# Patient Record
Sex: MALE | Race: WHITE | NOT HISPANIC OR LATINO | Employment: UNEMPLOYED | URBAN - METROPOLITAN AREA
[De-identification: names, ages, dates, MRNs, and addresses within clinical notes are randomized per-mention and may not be internally consistent; named-entity substitution may affect disease eponyms.]

---

## 2017-03-15 DIAGNOSIS — R22.0 SWELLING, MASS, OR LUMP IN HEAD AND NECK: Primary | ICD-10-CM

## 2017-03-15 DIAGNOSIS — R22.1 SWELLING, MASS, OR LUMP IN HEAD AND NECK: Primary | ICD-10-CM

## 2017-03-27 ENCOUNTER — HOSPITAL ENCOUNTER (OUTPATIENT)
Dept: RADIOLOGY | Facility: HOSPITAL | Age: 47
Discharge: HOME | End: 2017-03-27
Attending: FAMILY MEDICINE
Payer: COMMERCIAL

## 2017-03-27 DIAGNOSIS — R22.1 NECK FULLNESS: ICD-10-CM

## 2017-03-27 PROCEDURE — 76536 US EXAM OF HEAD AND NECK: CPT | Mod: TC,PO

## 2020-03-16 ENCOUNTER — HOSPITAL ENCOUNTER (EMERGENCY)
Facility: HOSPITAL | Age: 50
Discharge: HOME | End: 2020-03-16
Attending: EMERGENCY MEDICINE
Payer: COMMERCIAL

## 2020-03-16 VITALS
OXYGEN SATURATION: 99 % | HEART RATE: 81 BPM | HEIGHT: 67 IN | TEMPERATURE: 99 F | BODY MASS INDEX: 32.96 KG/M2 | DIASTOLIC BLOOD PRESSURE: 78 MMHG | WEIGHT: 210 LBS | SYSTOLIC BLOOD PRESSURE: 138 MMHG | RESPIRATION RATE: 18 BRPM

## 2020-03-16 DIAGNOSIS — J06.9 VIRAL URI WITH COUGH: Primary | ICD-10-CM

## 2020-03-16 DIAGNOSIS — R05.9 COUGH: ICD-10-CM

## 2020-03-16 DIAGNOSIS — R06.02 SHORTNESS OF BREATH: ICD-10-CM

## 2020-03-16 LAB
ALBUMIN SERPL BCP-MCNC: 4.7 G/DL (ref 3.5–5.2)
ALP SERPL-CCNC: 64 U/L (ref 38–126)
ALT SERPL W/O P-5'-P-CCNC: 26 U/L (ref 10–44)
ANION GAP SERPL CALC-SCNC: 9 MMOL/L (ref 8–16)
AST SERPL-CCNC: 35 U/L (ref 15–46)
BASOPHILS # BLD AUTO: 0.01 K/UL (ref 0–0.2)
BASOPHILS NFR BLD: 0.3 % (ref 0–1.9)
BILIRUB SERPL-MCNC: 0.3 MG/DL (ref 0.1–1)
BUN SERPL-MCNC: 9 MG/DL (ref 2–20)
CALCIUM SERPL-MCNC: 9.4 MG/DL (ref 8.7–10.5)
CHLORIDE SERPL-SCNC: 101 MMOL/L (ref 95–110)
CO2 SERPL-SCNC: 28 MMOL/L (ref 23–29)
CREAT SERPL-MCNC: 0.46 MG/DL (ref 0.5–1.4)
DIFFERENTIAL METHOD: ABNORMAL
EOSINOPHIL # BLD AUTO: 0 K/UL (ref 0–0.5)
EOSINOPHIL NFR BLD: 1.3 % (ref 0–8)
ERYTHROCYTE [DISTWIDTH] IN BLOOD BY AUTOMATED COUNT: 11.8 % (ref 11.5–14.5)
EST. GFR  (AFRICAN AMERICAN): >60 ML/MIN/1.73 M^2
EST. GFR  (NON AFRICAN AMERICAN): >60 ML/MIN/1.73 M^2
GLUCOSE SERPL-MCNC: 141 MG/DL (ref 70–110)
HCT VFR BLD AUTO: 40 % (ref 40–54)
HGB BLD-MCNC: 13.3 G/DL (ref 14–18)
IMM GRANULOCYTES # BLD AUTO: 0 K/UL (ref 0–0.04)
IMM GRANULOCYTES NFR BLD AUTO: 0 % (ref 0–0.5)
INFLUENZA A, MOLECULAR: NEGATIVE
INFLUENZA B, MOLECULAR: NEGATIVE
LYMPHOCYTES # BLD AUTO: 1.1 K/UL (ref 1–4.8)
LYMPHOCYTES NFR BLD: 36 % (ref 18–48)
MCH RBC QN AUTO: 31.5 PG (ref 27–31)
MCHC RBC AUTO-ENTMCNC: 33.3 G/DL (ref 32–36)
MCV RBC AUTO: 95 FL (ref 82–98)
MONOCYTES # BLD AUTO: 0.3 K/UL (ref 0.3–1)
MONOCYTES NFR BLD: 10.8 % (ref 4–15)
NEUTROPHILS # BLD AUTO: 1.6 K/UL (ref 1.8–7.7)
NEUTROPHILS NFR BLD: 51.6 % (ref 38–73)
NRBC BLD-RTO: 0 /100 WBC
PLATELET # BLD AUTO: 186 K/UL (ref 150–350)
PMV BLD AUTO: 8.9 FL (ref 9.2–12.9)
POTASSIUM SERPL-SCNC: 4 MMOL/L (ref 3.5–5.1)
PROT SERPL-MCNC: 8 G/DL (ref 6–8.4)
RBC # BLD AUTO: 4.22 M/UL (ref 4.6–6.2)
SODIUM SERPL-SCNC: 138 MMOL/L (ref 136–145)
SPECIMEN SOURCE: NORMAL
WBC # BLD AUTO: 3.14 K/UL (ref 3.9–12.7)

## 2020-03-16 PROCEDURE — 93005 ELECTROCARDIOGRAM TRACING: CPT | Mod: ER

## 2020-03-16 PROCEDURE — 87502 INFLUENZA DNA AMP PROBE: CPT | Mod: ER

## 2020-03-16 PROCEDURE — 80053 COMPREHEN METABOLIC PANEL: CPT | Mod: ER

## 2020-03-16 PROCEDURE — 85025 COMPLETE CBC W/AUTO DIFF WBC: CPT | Mod: ER

## 2020-03-16 PROCEDURE — 93010 EKG 12-LEAD: ICD-10-PCS | Mod: ,,, | Performed by: INTERNAL MEDICINE

## 2020-03-16 PROCEDURE — 99285 EMERGENCY DEPT VISIT HI MDM: CPT | Mod: 25,ER

## 2020-03-16 PROCEDURE — 93010 ELECTROCARDIOGRAM REPORT: CPT | Mod: ,,, | Performed by: INTERNAL MEDICINE

## 2020-03-16 NOTE — DISCHARGE INSTRUCTIONS
You do not meet the criteria for testing. Recommend you self quarantine (stay home and avoid public places). If your symptoms change or worsen, you should call the [facility] On [facility] line at [phone] or [phone] for immediate assistance and guidance on whether an in-person visit is needed.?

## 2020-03-16 NOTE — EKG INTERPRETATIONS - EMERGENCY DEPT.
Time of study: 03/16/2020 16:10    Independent interpretation by ED physician.    Normal sinus rhythm. Ventricular rate 81 bpm.  Normal axis.  Normal QRS and QT intervals.  No ST segment elevation or depression.  Normal T-wave morphology. Overall impression:  Normal EKG.

## 2020-03-16 NOTE — ED NOTES
Pt placed on isolation, lung signs are clear no apparent distress. O2 Sat is 100% currently on continuous monitoring.

## 2020-03-16 NOTE — ED PROVIDER NOTES
"Encounter Date: 3/16/2020       History     Chief Complaint   Patient presents with    Cough     Pt with c/o cough, fever (98-99) ans nausea x5 days. Pt also c/o being light headed and SOB that started today. Pt denies chest pain.     Fever    Shortness of Breath    Dizziness     Patient is a 49 year old male who presents with flu like symptoms for one week. He reports history of polymyositis for which he is no longer seeing rheumatology because they "wanted to treat me without a diagnosis". He is not on any steroids. He is not on immunosuppressive medications. He states he always a cough secondary to his blood pressure medication but recently "it has been breaking up in my chest". He had a Tmax of 99F which he states he has been checking at home. He reports shortness of breath that started 30 minutes PTA. He denied any chest pain, leg swelling, calf tenderness, recent travel or hormone use.  He denied recent sick contact.            Review of patient's allergies indicates:   Allergen Reactions    Methotrexate analogues Shortness Of Breath    Naproxen      Past Medical History:   Diagnosis Date    Arthritis     Polymyositis      Past Surgical History:   Procedure Laterality Date    CARPAL TUNNEL RELEASE  04/08/2013    right hand    Right knee arthroscopic  1991     Family History   Problem Relation Age of Onset    Diabetes Mother     Neuropathy Mother     Hypertension Mother     Heart disease Mother     Rheumatic fever Mother     Diabetes Father     Neuropathy Father     Cancer Father         multiple myeloma    Sarcoidosis Sister     Colon cancer Neg Hx     Celiac disease Neg Hx     Cirrhosis Neg Hx     Colon polyps Neg Hx     Crohn's disease Neg Hx     Cystic fibrosis Neg Hx     Esophageal cancer Neg Hx     Hemochromatosis Neg Hx     Inflammatory bowel disease Neg Hx     Irritable bowel syndrome Neg Hx     Liver cancer Neg Hx     Liver disease Neg Hx     Rectal cancer Neg Hx     " Stomach cancer Neg Hx     Ulcerative colitis Neg Hx     Zachery's disease Neg Hx      Social History     Tobacco Use    Smoking status: Current Every Day Smoker     Packs/day: 1.00     Years: 30.00     Pack years: 30.00     Types: Cigarettes    Smokeless tobacco: Never Used   Substance Use Topics    Alcohol use: Yes     Comment: rarely    Drug use: No     Review of Systems   Constitutional: Negative for activity change, appetite change, chills and fever.   HENT: Positive for sore throat. Negative for congestion and rhinorrhea.    Eyes: Negative for redness and visual disturbance.   Respiratory: Positive for cough and shortness of breath. Negative for chest tightness.    Cardiovascular: Negative for chest pain, palpitations and leg swelling.   Gastrointestinal: Negative for abdominal pain, diarrhea, nausea and vomiting.   Genitourinary: Negative for dysuria and frequency.   Musculoskeletal: Negative for back pain, neck pain and neck stiffness.   Skin: Negative for rash.   Neurological: Negative for dizziness, syncope, numbness and headaches.       Physical Exam     Initial Vitals [03/16/20 1536]   BP Pulse Resp Temp SpO2   139/89 83 18 98.5 °F (36.9 °C) 98 %      MAP       --         Physical Exam    Nursing note and vitals reviewed.  Constitutional: He appears well-developed and well-nourished. He is cooperative.  Non-toxic appearance. He does not have a sickly appearance.   HENT:   Head: Normocephalic and atraumatic.   Right Ear: External ear normal.   Left Ear: External ear normal.   Nose: Nose normal.   Mouth/Throat: Uvula is midline and oropharynx is clear and moist.   Eyes: Conjunctivae and lids are normal. Pupils are equal, round, and reactive to light.   Neck: Normal range of motion and full passive range of motion without pain. Neck supple.   Cardiovascular: Normal rate, regular rhythm and normal heart sounds. Exam reveals no gallop and no friction rub.    No murmur heard.  Pulmonary/Chest: Breath  sounds normal. He has no wheezes. He has no rhonchi. He has no rales.   Abdominal: Soft. Normal appearance. There is no tenderness. There is no rigidity, no rebound and no guarding.   Neurological: He is alert and oriented to person, place, and time. GCS eye subscore is 4. GCS verbal subscore is 5. GCS motor subscore is 6.   Skin: Skin is warm, dry and intact. No rash noted.         ED Course   Procedures  Labs Reviewed   CBC W/ AUTO DIFFERENTIAL - Abnormal; Notable for the following components:       Result Value    WBC 3.14 (*)     RBC 4.22 (*)     Hemoglobin 13.3 (*)     Mean Corpuscular Hemoglobin 31.5 (*)     MPV 8.9 (*)     Gran # (ANC) 1.6 (*)     All other components within normal limits   COMPREHENSIVE METABOLIC PANEL - Abnormal; Notable for the following components:    Glucose 141 (*)     Creatinine 0.46 (*)     All other components within normal limits   INFLUENZA A & B BY MOLECULAR     EKG Readings: (Independently Interpreted)   Rhythm: Normal Sinus Rhythm. Heart Rate: 77. Ectopy: No Ectopy. Conduction: Normal. ST Segments: Normal ST Segments. T Waves: Normal. Clinical Impression: Normal Sinus Rhythm       Imaging Results          X-Ray Chest 1 View (Final result)  Result time 03/16/20 16:22:56    Final result by LEISA Christiansen Sr., MD (03/16/20 16:22:56)                 Impression:      1. There is no evidence of an acute pulmonary process.  2. The size of the heart is prominent.  This may be secondary to magnification.  .      Electronically signed by: Zak Christiansen MD  Date:    03/16/2020  Time:    16:22             Narrative:    EXAMINATION:  XR CHEST 1 VIEW    CLINICAL HISTORY:  Cough    COMPARISON:  06/19/2015    FINDINGS:  The size of the heart is prominent.  There is no evidence of an acute pulmonary process.  There is no pneumothorax.  The costophrenic angles are sharp.                                 Medical Decision Making:   History:   Old Medical Records: I decided to obtain old medical  records.  Clinical Tests:   Lab Tests: Ordered and Reviewed  Radiological Study: Ordered and Reviewed  Medical Tests: Ordered and Reviewed       APC / Resident Notes:   Urgent evaluation of a 49 year old male who presents with cough, fever and shortness of breath. He reports Tmax of 99F. Vital signs reviewed. Abdomen is soft and nontender.  There is no rebound, rigidity or distention.  I doubt intra-abdominal process.  Bilateral TMs with no erythema, retraction or perforation.  There is no mastoid tenderness.  There is no movement tenderness to bilateral ears.  No tonsillar swelling or exudate noted.  Uvula is midline. I doubt strep. Breath sounds are clear and equal bilaterally. EKG shows no acute changes. CXR is normal. Labs are unremarkable. According to current testing criteria, patient does not meet criteria for COVID 19 testing. Recommend self quaratine. Given the number for patient hotline if symptoms change.  Symptomatic treatment. Discussed results with patient. Return precautions given. Patient is to follow up with their primary care provider.                               Clinical Impression:       ICD-10-CM ICD-9-CM   1. Viral URI with cough J06.9 465.9    B97.89    2. Cough R05 786.2   3. Shortness of breath R06.02 786.05             ED Disposition Condition    Discharge Stable        ED Prescriptions     None        Follow-up Information     Follow up With Specialties Details Why Contact Info    [facility]  Schedule an appointment as soon as possible for a visit  For follow up if you don't have a primary care provider [phone]    [facility]  As needed [address] [facility] [id number] [phone]                                     Ana Villavicencio PA-C  03/16/20 1955

## 2021-07-01 ENCOUNTER — PATIENT MESSAGE (OUTPATIENT)
Dept: ADMINISTRATIVE | Facility: OTHER | Age: 51
End: 2021-07-01

## 2024-03-03 ENCOUNTER — HOSPITAL ENCOUNTER (EMERGENCY)
Facility: HOSPITAL | Age: 54
Discharge: HOME | End: 2024-03-03
Attending: FAMILY MEDICINE
Payer: OTHER GOVERNMENT

## 2024-03-03 VITALS
SYSTOLIC BLOOD PRESSURE: 133 MMHG | OXYGEN SATURATION: 99 % | TEMPERATURE: 97 F | BODY MASS INDEX: 34.53 KG/M2 | RESPIRATION RATE: 19 BRPM | DIASTOLIC BLOOD PRESSURE: 62 MMHG | WEIGHT: 220 LBS | HEIGHT: 67 IN | HEART RATE: 62 BPM

## 2024-03-03 DIAGNOSIS — F41.9 ANXIETY: Primary | ICD-10-CM

## 2024-03-03 DIAGNOSIS — R00.2 PALPITATIONS: ICD-10-CM

## 2024-03-03 LAB
ALBUMIN SERPL BCP-MCNC: 4.8 G/DL (ref 3.5–5.2)
ALP SERPL-CCNC: 73 U/L (ref 38–126)
ALT SERPL W/O P-5'-P-CCNC: 29 U/L (ref 10–44)
ANION GAP SERPL CALC-SCNC: 10 MMOL/L (ref 8–16)
AST SERPL-CCNC: 32 U/L (ref 15–46)
BASOPHILS # BLD AUTO: 0.04 K/UL (ref 0–0.2)
BASOPHILS NFR BLD: 0.8 % (ref 0–1.9)
BILIRUB SERPL-MCNC: 0.5 MG/DL (ref 0.1–1)
CALCIUM SERPL-MCNC: 10.2 MG/DL (ref 8.7–10.5)
CHLORIDE SERPL-SCNC: 101 MMOL/L (ref 95–110)
CO2 SERPL-SCNC: 25 MMOL/L (ref 23–29)
CREAT SERPL-MCNC: 0.52 MG/DL (ref 0.5–1.4)
DIFFERENTIAL METHOD BLD: ABNORMAL
EOSINOPHIL # BLD AUTO: 0.2 K/UL (ref 0–0.5)
EOSINOPHIL NFR BLD: 3.1 % (ref 0–8)
ERYTHROCYTE [DISTWIDTH] IN BLOOD BY AUTOMATED COUNT: 11.9 % (ref 11.5–14.5)
EST. GFR  (NO RACE VARIABLE): >60 ML/MIN/1.73 M^2
GLUCOSE SERPL-MCNC: 136 MG/DL (ref 70–110)
HCT VFR BLD AUTO: 41.5 % (ref 40–54)
HGB BLD-MCNC: 14.1 G/DL (ref 14–18)
IMM GRANULOCYTES # BLD AUTO: 0.01 K/UL (ref 0–0.04)
IMM GRANULOCYTES NFR BLD AUTO: 0.2 % (ref 0–0.5)
LYMPHOCYTES # BLD AUTO: 1.5 K/UL (ref 1–4.8)
LYMPHOCYTES NFR BLD: 29.8 % (ref 18–48)
MCH RBC QN AUTO: 31.7 PG (ref 27–31)
MCHC RBC AUTO-ENTMCNC: 34 G/DL (ref 32–36)
MCV RBC AUTO: 93 FL (ref 82–98)
MONOCYTES # BLD AUTO: 0.4 K/UL (ref 0.3–1)
MONOCYTES NFR BLD: 8.6 % (ref 4–15)
NEUTROPHILS # BLD AUTO: 2.8 K/UL (ref 1.8–7.7)
NEUTROPHILS NFR BLD: 57.5 % (ref 38–73)
NRBC BLD-RTO: 0 /100 WBC
NT-PROBNP SERPL-MCNC: 40 PG/ML (ref 5–900)
PLATELET # BLD AUTO: 272 K/UL (ref 150–450)
PMV BLD AUTO: 9 FL (ref 9.2–12.9)
POTASSIUM SERPL-SCNC: 3.9 MMOL/L (ref 3.5–5.1)
PROT SERPL-MCNC: 8.6 G/DL (ref 6–8.4)
RBC # BLD AUTO: 4.45 M/UL (ref 4.6–6.2)
SODIUM SERPL-SCNC: 136 MMOL/L (ref 136–145)
TROPONIN I SERPL-MCNC: <0.012 NG/ML (ref 0.01–0.03)
UUN UR-MCNC: 14 MG/DL (ref 2–20)
WBC # BLD AUTO: 4.9 K/UL (ref 3.9–12.7)

## 2024-03-03 PROCEDURE — 93010 ELECTROCARDIOGRAM REPORT: CPT | Mod: ,,, | Performed by: INTERNAL MEDICINE

## 2024-03-03 PROCEDURE — 99284 EMERGENCY DEPT VISIT MOD MDM: CPT | Mod: 25,ER

## 2024-03-03 PROCEDURE — 99900035 HC TECH TIME PER 15 MIN (STAT): Mod: ER

## 2024-03-03 PROCEDURE — 85025 COMPLETE CBC W/AUTO DIFF WBC: CPT | Mod: ER | Performed by: FAMILY MEDICINE

## 2024-03-03 PROCEDURE — 93005 ELECTROCARDIOGRAM TRACING: CPT | Mod: ER

## 2024-03-03 PROCEDURE — 80053 COMPREHEN METABOLIC PANEL: CPT | Mod: ER | Performed by: FAMILY MEDICINE

## 2024-03-03 PROCEDURE — 83880 ASSAY OF NATRIURETIC PEPTIDE: CPT | Mod: ER | Performed by: FAMILY MEDICINE

## 2024-03-03 PROCEDURE — 84484 ASSAY OF TROPONIN QUANT: CPT | Mod: ER | Performed by: FAMILY MEDICINE

## 2024-03-03 NOTE — ED PROVIDER NOTES
Encounter Date: 3/3/2024       History     Chief Complaint   Patient presents with    Medication Reaction     Pt started metoprolol 12.5mg this morning. Pt states he know feels jittery, faint, and flush.      53-year-old male has been prescribed metoprolol for his blood pressure.  Patient claims he is feeling jittery faint and flushed immediately after taking at 6:15 a.m. today.  No tingling numbness or weakness.  No chest pain or shortness of breath.    The history is provided by the patient.     Review of patient's allergies indicates:   Allergen Reactions    Methotrexate analogues Shortness Of Breath    Naproxen      Past Medical History:   Diagnosis Date    Arthritis     Polymyositis      Past Surgical History:   Procedure Laterality Date    CARPAL TUNNEL RELEASE  04/08/2013    right hand    Right knee arthroscopic  1991     Family History   Problem Relation Age of Onset    Diabetes Mother     Neuropathy Mother     Hypertension Mother     Heart disease Mother     Rheumatic fever Mother     Diabetes Father     Neuropathy Father     Cancer Father         multiple myeloma    Sarcoidosis Sister     Colon cancer Neg Hx     Celiac disease Neg Hx     Cirrhosis Neg Hx     Colon polyps Neg Hx     Crohn's disease Neg Hx     Cystic fibrosis Neg Hx     Esophageal cancer Neg Hx     Hemochromatosis Neg Hx     Inflammatory bowel disease Neg Hx     Irritable bowel syndrome Neg Hx     Liver cancer Neg Hx     Liver disease Neg Hx     Rectal cancer Neg Hx     Stomach cancer Neg Hx     Ulcerative colitis Neg Hx     Zachery's disease Neg Hx      Social History     Tobacco Use    Smoking status: Every Day     Current packs/day: 1.00     Average packs/day: 1 pack/day for 30.0 years (30.0 ttl pk-yrs)     Types: Cigarettes    Smokeless tobacco: Never   Substance Use Topics    Alcohol use: Yes     Comment: rarely    Drug use: No     Review of Systems   Constitutional:  Negative for fever.   HENT:  Negative for sore throat.    Respiratory:   Negative for shortness of breath.    Cardiovascular:  Negative for chest pain.   Gastrointestinal:  Negative for nausea.   Genitourinary:  Negative for dysuria.   Musculoskeletal:  Negative for back pain.   Skin:  Negative for rash.   Neurological:  Negative for weakness.   Hematological:  Does not bruise/bleed easily.   All other systems reviewed and are negative.      Physical Exam     Initial Vitals [03/03/24 0638]   BP Pulse Resp Temp SpO2   (!) 163/75 74 16 97.4 °F (36.3 °C) 100 %      MAP       --         Physical Exam    Nursing note and vitals reviewed.  Constitutional: Vital signs are normal. He appears well-developed and well-nourished. He is active. No distress.   HENT:   Head: Normocephalic.   Nose: Nose normal.   Mouth/Throat: Oropharynx is clear and moist and mucous membranes are normal.   Eyes: Conjunctivae, EOM and lids are normal.   Neck: Neck supple.   Normal range of motion.  Cardiovascular:  Normal rate, regular rhythm, S1 normal, S2 normal and intact distal pulses.           Murmur heard.  Systolic murmur is present with a grade of 4/6.  Pulmonary/Chest: Breath sounds normal. No respiratory distress. He has no wheezes. He has no rales.   Abdominal: Abdomen is soft. Bowel sounds are normal. He exhibits no distension. There is no abdominal tenderness. There is no rebound.   Musculoskeletal:      Right upper arm: Normal.      Left upper arm: Normal.      Cervical back: Normal range of motion and neck supple.      Right lower leg: Normal.      Left lower leg: Normal.     Neurological: He is alert and oriented to person, place, and time. He has normal strength. GCS score is 15. GCS eye subscore is 4. GCS verbal subscore is 5. GCS motor subscore is 6.   Skin: Skin is warm. Capillary refill takes less than 2 seconds.   Psychiatric: He has a normal mood and affect. His speech is normal and behavior is normal. Thought content normal. Cognition and memory are normal.         ED Course   Procedures  Labs  Reviewed   CBC W/ AUTO DIFFERENTIAL - Abnormal; Notable for the following components:       Result Value    RBC 4.45 (*)     MCH 31.7 (*)     MPV 9.0 (*)     All other components within normal limits   COMPREHENSIVE METABOLIC PANEL - Abnormal; Notable for the following components:    Glucose 136 (*)     Total Protein 8.6 (*)     All other components within normal limits   NT-PRO NATRIURETIC PEPTIDE   TROPONIN I     EKG Readings: (Independently Interpreted)   Initial Reading: No STEMI. Rhythm: Normal Sinus Rhythm. Heart Rate: 61. Ectopy: No Ectopy. Conduction: Normal. ST Segments: Normal ST Segments. T Waves: Normal. Clinical Impression: Normal Sinus Rhythm       Imaging Results    None          Medications - No data to display  Medical Decision Making  53-year-old male started on metoprolol for his blood pressure and took 1st half dose this morning on empty stomach and felt like fainting.  His blood pressure was stable.  His heart rate stable on arrival to ED. no chest pain or shortness of breath.    Differential diagnosis include and not limited to medication side effects, hypoglycemia, hypotension, bradycardia.    Patient placed on cardiac monitor with normal sinus rhythm.  His heart rate 60-70 on an average.  EKG with no acute changes.  Labs reviewed with no acute findings.  Of WBC 4.9  Patient asymptomatic currently.  Will discharge home and advised to eat and try medication if his blood pressure and heart rate are within normal range.  Follow up immediately with ED with any worsening symptoms.  Advised to follow up with his PCP/cardiology with any further symptoms or change or control his meds.    Amount and/or Complexity of Data Reviewed  Labs: ordered. Decision-making details documented in ED Course.                                      Clinical Impression:  Final diagnoses:  [R00.2] Palpitations  [F41.9] Anxiety (Primary)          ED Disposition Condition    Discharge Stable          ED Prescriptions     None       Follow-up Information       Follow up With Specialties Details Why Contact Info    Primarycare physician  In 2 days F/U              Lane Choudhary MD  03/03/24 0958

## 2024-03-07 LAB
OHS QRS DURATION: 98 MS
OHS QTC CALCULATION: 406 MS

## 2024-03-18 ENCOUNTER — HOSPITAL ENCOUNTER (EMERGENCY)
Facility: HOSPITAL | Age: 54
Discharge: HOME | End: 2024-03-18
Attending: FAMILY MEDICINE
Payer: OTHER GOVERNMENT

## 2024-03-18 VITALS
WEIGHT: 220 LBS | HEIGHT: 67 IN | OXYGEN SATURATION: 96 % | TEMPERATURE: 98 F | DIASTOLIC BLOOD PRESSURE: 80 MMHG | HEART RATE: 66 BPM | SYSTOLIC BLOOD PRESSURE: 176 MMHG | BODY MASS INDEX: 34.53 KG/M2 | RESPIRATION RATE: 20 BRPM

## 2024-03-18 DIAGNOSIS — T50.905D ADVERSE EFFECT OF DRUG, SUBSEQUENT ENCOUNTER: Primary | ICD-10-CM

## 2024-03-18 DIAGNOSIS — R06.02 SOB (SHORTNESS OF BREATH): ICD-10-CM

## 2024-03-18 DIAGNOSIS — R07.9 CHEST PAIN: ICD-10-CM

## 2024-03-18 DIAGNOSIS — I10 HYPERTENSION, UNSPECIFIED TYPE: ICD-10-CM

## 2024-03-18 LAB
ALBUMIN SERPL BCP-MCNC: 4.3 G/DL (ref 3.5–5.2)
ALP SERPL-CCNC: 78 U/L (ref 38–126)
ALT SERPL W/O P-5'-P-CCNC: 27 U/L (ref 10–44)
ANION GAP SERPL CALC-SCNC: 8 MMOL/L (ref 8–16)
AST SERPL-CCNC: 28 U/L (ref 15–46)
BASOPHILS # BLD AUTO: 0.04 K/UL (ref 0–0.2)
BASOPHILS NFR BLD: 0.8 % (ref 0–1.9)
BILIRUB SERPL-MCNC: 0.3 MG/DL (ref 0.1–1)
CALCIUM SERPL-MCNC: 9.4 MG/DL (ref 8.7–10.5)
CHLORIDE SERPL-SCNC: 103 MMOL/L (ref 95–110)
CO2 SERPL-SCNC: 28 MMOL/L (ref 23–29)
CREAT SERPL-MCNC: 0.59 MG/DL (ref 0.5–1.4)
DIFFERENTIAL METHOD BLD: ABNORMAL
EOSINOPHIL # BLD AUTO: 0.3 K/UL (ref 0–0.5)
EOSINOPHIL NFR BLD: 5 % (ref 0–8)
ERYTHROCYTE [DISTWIDTH] IN BLOOD BY AUTOMATED COUNT: 12.3 % (ref 11.5–14.5)
EST. GFR  (NO RACE VARIABLE): >60 ML/MIN/1.73 M^2
GLUCOSE SERPL-MCNC: 121 MG/DL (ref 70–110)
HCT VFR BLD AUTO: 36.8 % (ref 40–54)
HGB BLD-MCNC: 12.6 G/DL (ref 14–18)
IMM GRANULOCYTES # BLD AUTO: 0.02 K/UL (ref 0–0.04)
IMM GRANULOCYTES NFR BLD AUTO: 0.4 % (ref 0–0.5)
LYMPHOCYTES # BLD AUTO: 1.7 K/UL (ref 1–4.8)
LYMPHOCYTES NFR BLD: 33.7 % (ref 18–48)
MCH RBC QN AUTO: 32.1 PG (ref 27–31)
MCHC RBC AUTO-ENTMCNC: 34.2 G/DL (ref 32–36)
MCV RBC AUTO: 94 FL (ref 82–98)
MONOCYTES # BLD AUTO: 0.5 K/UL (ref 0.3–1)
MONOCYTES NFR BLD: 9.9 % (ref 4–15)
NEUTROPHILS # BLD AUTO: 2.6 K/UL (ref 1.8–7.7)
NEUTROPHILS NFR BLD: 50.2 % (ref 38–73)
NRBC BLD-RTO: 0 /100 WBC
NT-PROBNP SERPL-MCNC: 52 PG/ML (ref 5–900)
PLATELET # BLD AUTO: 258 K/UL (ref 150–450)
PMV BLD AUTO: 8.9 FL (ref 9.2–12.9)
POTASSIUM SERPL-SCNC: 3.8 MMOL/L (ref 3.5–5.1)
PROT SERPL-MCNC: 8 G/DL (ref 6–8.4)
RBC # BLD AUTO: 3.92 M/UL (ref 4.6–6.2)
SODIUM SERPL-SCNC: 139 MMOL/L (ref 136–145)
TROPONIN I SERPL-MCNC: <0.012 NG/ML (ref 0.01–0.03)
UUN UR-MCNC: 14 MG/DL (ref 2–20)
WBC # BLD AUTO: 5.17 K/UL (ref 3.9–12.7)

## 2024-03-18 PROCEDURE — 93010 ELECTROCARDIOGRAM REPORT: CPT | Mod: ,,, | Performed by: INTERNAL MEDICINE

## 2024-03-18 PROCEDURE — 93005 ELECTROCARDIOGRAM TRACING: CPT | Mod: ER

## 2024-03-18 PROCEDURE — 80053 COMPREHEN METABOLIC PANEL: CPT | Mod: ER

## 2024-03-18 PROCEDURE — 84484 ASSAY OF TROPONIN QUANT: CPT | Mod: ER

## 2024-03-18 PROCEDURE — 83880 ASSAY OF NATRIURETIC PEPTIDE: CPT | Mod: ER

## 2024-03-18 PROCEDURE — 85025 COMPLETE CBC W/AUTO DIFF WBC: CPT | Mod: ER

## 2024-03-18 PROCEDURE — 99285 EMERGENCY DEPT VISIT HI MDM: CPT | Mod: 25,ER

## 2024-03-18 RX ORDER — ISOSORBIDE MONONITRATE 30 MG/1
30 TABLET, EXTENDED RELEASE ORAL DAILY
Qty: 30 TABLET | Refills: 5 | Status: SHIPPED | OUTPATIENT
Start: 2024-03-18 | End: 2024-04-23

## 2024-03-18 RX ORDER — METOPROLOL TARTRATE 25 MG/1
25 TABLET, FILM COATED ORAL 2 TIMES DAILY
COMMUNITY
End: 2024-03-18

## 2024-03-18 NOTE — ED NOTES
Review of patient's allergies indicates:   Allergen Reactions    Methotrexate analogues Shortness Of Breath    Naproxen         Patient has verified the spelling of the name and  on armband.   APPEARANCE: Patient is alert, calm, oriented x 4, and does not appear distressed.  SKIN: Skin is normal for race, warm, and dry. Normal skin turgor and mucous membranes moist. Has scattered rash to his face with redness noted, that he states is from another BP medication and that is why he was changed to Metoprolol.   CARDIAC: Normal rate and rhythm, no murmur heard. C/o intermittent chest pain since starting Metoprolol.    RESPIRATORY:Normal rate and effort. Breath sounds clear bilaterally throughout chest. Respirations are equal and unlabored.  C/o intermittent  SOB since taking Metoprolol that he started 2 weeks ago.   GASTRO: Bowel sounds normal, abdomen is soft, no tenderness, and no abdominal distention.  MUSCLE: Full ROM. No bony tenderness or soft tissue tenderness. No obvious deformity.  PERIPHERAL VASCULAR: peripheral pulses present. Normal cap refill. No edema. Warm to touch.  NEURO: Hemlock coma scale: eyes open spontaneously-4, oriented & converses-5, obeys commands-6. No neurological abnormalities.   MENTAL STATUS: awake, alert and aware of environment.  EYE: No overt deficits noted. No drainage. Sclera WNL  ENT: EARS: no obvious drainage. NOSE: no active bleeding. THROAT: no redness or swelling..  : Voids without complication per patient

## 2024-03-18 NOTE — ED PROVIDER NOTES
Encounter Date: 3/18/2024       History     Chief Complaint   Patient presents with    Shortness of Breath    Chest Pain     Pt reports being on metoprolol tartrate 25 mg about 2 weeks ago, now having SOB, fatigue, chest pain, and HTN. States /160.      Malik Cunningham Jr. is a 53 y.o. male who has a past medical history of Arthritis and Polymyositis. presenting to the Emergency Department for possible adverse effect of medication.   He reports medication change about two weeks ago, switched from lisinopril to metoprolol 12.5 mg bid due to facial rash. Since the medication change he feels short of breath when he first lays down to go to sleep. Reports he has to really focus on his breathing and this is worrying him. He also feels short of breath and palpitations with exertion. The palpitations are discomforting and this is what he attributes the chest pain to when reporting CP in triage. Also reports lightheadedness, facial flushing, mild headache. Reports orthopnea. No PND. No lower extremity edema. No cough. He does have history of interstitial lung disease, but no asthma. Has intermittently used albuterol inhaler in the past but none recently. He feels mild shortness of breath at this time, but otherwise feels fairly well. He does monitor his BP multiple times a day and is concerned the metoprolol is not adequately controlling his pressure. He has been taking the medication 12.5 q8 and reports home readings 150-160s. He has attempted to follow up with his primary care doctor today, but has been told an available appointment is months out.      The history is provided by the patient.     Review of patient's allergies indicates:   Allergen Reactions    Methotrexate analogues Shortness Of Breath    Naproxen      Past Medical History:   Diagnosis Date    Arthritis     Polymyositis      Past Surgical History:   Procedure Laterality Date    CARPAL TUNNEL RELEASE  04/08/2013    right hand    Right knee  arthroscopic  1991     Family History   Problem Relation Age of Onset    Diabetes Mother     Neuropathy Mother     Hypertension Mother     Heart disease Mother     Rheumatic fever Mother     Diabetes Father     Neuropathy Father     Cancer Father         multiple myeloma    Sarcoidosis Sister     Colon cancer Neg Hx     Celiac disease Neg Hx     Cirrhosis Neg Hx     Colon polyps Neg Hx     Crohn's disease Neg Hx     Cystic fibrosis Neg Hx     Esophageal cancer Neg Hx     Hemochromatosis Neg Hx     Inflammatory bowel disease Neg Hx     Irritable bowel syndrome Neg Hx     Liver cancer Neg Hx     Liver disease Neg Hx     Rectal cancer Neg Hx     Stomach cancer Neg Hx     Ulcerative colitis Neg Hx     Zachery's disease Neg Hx      Social History     Tobacco Use    Smoking status: Every Day     Current packs/day: 1.00     Average packs/day: 1 pack/day for 30.0 years (30.0 ttl pk-yrs)     Types: Cigarettes    Smokeless tobacco: Never   Substance Use Topics    Alcohol use: Yes     Comment: rarely    Drug use: No     Review of Systems   Constitutional:  Negative for chills and fever.   HENT:  Negative for congestion.         Facial flushing   Respiratory:  Positive for shortness of breath. Negative for cough.    Cardiovascular:  Positive for palpitations. Negative for chest pain and leg swelling.   Gastrointestinal:  Negative for abdominal pain, nausea and vomiting.   Genitourinary:  Negative for dysuria.   Neurological:  Positive for light-headedness and headaches. Negative for syncope, speech difficulty and weakness.   Psychiatric/Behavioral:  Negative for agitation and confusion.        Physical Exam     Initial Vitals [03/18/24 1536]   BP Pulse Resp Temp SpO2   (!) 165/75 70 20 98.3 °F (36.8 °C) 97 %      MAP       --         Physical Exam    Nursing note and vitals reviewed.  Constitutional: He appears well-developed and well-nourished. He is not diaphoretic.  Non-toxic appearance. No distress.   HENT:   Head:  Normocephalic and atraumatic.   Right Ear: External ear normal.   Left Ear: External ear normal.   Mouth/Throat: Oropharynx is clear and moist.   Eyes: Conjunctivae and EOM are normal. Pupils are equal, round, and reactive to light.   Neck: Neck supple.   Normal range of motion.  Cardiovascular:  Normal rate and regular rhythm.           Pulmonary/Chest: Breath sounds normal. No respiratory distress. He has no wheezes. He has no rales.   Abdominal: He exhibits no distension.   Musculoskeletal:         General: No edema. Normal range of motion.      Cervical back: Normal range of motion and neck supple.     Neurological: He is alert and oriented to person, place, and time. GCS score is 15. GCS eye subscore is 4. GCS verbal subscore is 5. GCS motor subscore is 6.   Skin: Skin is dry. Rash (facial erythema and papules) noted.   Psychiatric: He has a normal mood and affect. His behavior is normal. Judgment and thought content normal.         ED Course   Procedures  Labs Reviewed   CBC W/ AUTO DIFFERENTIAL - Abnormal; Notable for the following components:       Result Value    RBC 3.92 (*)     Hemoglobin 12.6 (*)     Hematocrit 36.8 (*)     MCH 32.1 (*)     MPV 8.9 (*)     All other components within normal limits   COMPREHENSIVE METABOLIC PANEL - Abnormal; Notable for the following components:    Glucose 121 (*)     All other components within normal limits   TROPONIN I   NT-PRO NATRIURETIC PEPTIDE     EKG Readings: (Independently Interpreted)   Initial Reading: No STEMI. Rhythm: Normal Sinus Rhythm. Heart Rate: 67. Ectopy: No Ectopy.       Imaging Results              X-Ray Chest PA And Lateral (Final result)  Result time 03/18/24 16:31:23      Final result by Jason Fuchs MD (03/18/24 16:31:23)                   Impression:      No acute findings.      Electronically signed by: Jason Fuchs MD  Date:    03/18/2024  Time:    16:31               Narrative:    EXAMINATION:  XR CHEST PA AND LATERAL    CLINICAL  HISTORY:  Chest Pain;    TECHNIQUE:  PA and lateral views of the chest were performed.    COMPARISON:  03/16/2020    FINDINGS:  The cardiac and mediastinal silhouettes appear within normal limits.   The lungs are clear bilaterally.  No acute osseous findings demonstrated.                                       Medications - No data to display  Medical Decision Making  53-year-old presents to the ED with a constellation of symptoms that began after starting metoprolol including SOB/MATHIS, palpitations that he correlates with CP, facial flushing, lightheadedness, mild headache. He reports feeling fairly well at this time. Vitals mildly hypertensive but otherwise normal. Normal exam. Cardiac workup.     Differential Diagnosis includes, but is not limited to:  ACS/MI, PE, aortic dissection, pneumothorax, cardiac tamponade, pericarditis/myocarditis, pneumonia, infection/abscess, lung mass, trauma/fracture, costochondritis/pleurisy, MSK pain/contusion, GERD, biliary disease, pancreatitis, anemia, adverse effect of medication    Labs grossly unrevealing.  Chest x-ray is normal.  EKG NSR with no ischemic changes.  Discussed patient's presentation and workup with my attending Dr. Choudhary who agrees it is not unreasonable to change patient's BP medication given difficulty following up with PCP. Will discontinue metoprolol and switch to Imdur.  Patient instructed to continue calling his primary care doctor to obtain a follow up appointment. He voiced understanding and is agreeable to this plan. All questions answered.     Amount and/or Complexity of Data Reviewed  External Data Reviewed: notes.     Details: Seen 3/3/24 for lightheadedness after initial dose of metoprolol  Labs: ordered. Decision-making details documented in ED Course.  Radiology: ordered. Decision-making details documented in ED Course.    Risk  Prescription drug management.      Additional MDM:   Heart Score:    History:          Slightly suspicious.  ECG:              Normal  Age:               45-65 years  Risk factors: 1-2 risk factors  Troponin:       Less than or equal to normal limit  Heart Score = 2                ED Course as of 03/19/24 1643   Mon Mar 18, 2024   1646 X-Ray Chest PA And Lateral  No acute findings. [CS]   1704 Discussed patient with Dr. Choudhary. Not unreasonable to change BP med to imdur or hctz [CS]   1728 CBC auto differential(!)  Mild anemia. No leukocytosis [CS]   1753 Comprehensive metabolic panel(!)  No significant abnormality [CS]   1753 NT-proBNP: 52  WNL [CS]   1753 Troponin I: <0.012  WNL [CS]      ED Course User Index  [CS] Jen Quezada PA-C                           Clinical Impression:  Final diagnoses:  [R06.02] SOB (shortness of breath)  [R07.9] Chest pain  [I10] Hypertension, unspecified type  [T50.905D] Adverse effect of drug, subsequent encounter (Primary)          ED Disposition Condition    Discharge Stable          ED Prescriptions       Medication Sig Dispense Start Date End Date Auth. Provider    isosorbide mononitrate (IMDUR) 30 MG 24 hr tablet Take 1 tablet (30 mg total) by mouth once daily. 30 tablet 3/18/2024 9/14/2024 Jen Quezada PA-C          Follow-up Information       Follow up With Specialties Details Why Contact Info    Primary Care Provider  Schedule an appointment as soon as possible for a visit in 2 days               Jen Quezada PA-C  03/19/24 1642       Jen Quezada PA-C  03/19/24 1643

## 2024-03-18 NOTE — DISCHARGE INSTRUCTIONS
Please schedule a follow up as soon as possible with your primary care doctor.     START taking imdur.     STOP taking the metoprolol.     Your blood work looks reassuring here today.

## 2024-03-20 ENCOUNTER — HOSPITAL ENCOUNTER (EMERGENCY)
Facility: HOSPITAL | Age: 54
Discharge: HOME | End: 2024-03-20
Attending: EMERGENCY MEDICINE
Payer: OTHER GOVERNMENT

## 2024-03-20 VITALS
OXYGEN SATURATION: 98 % | RESPIRATION RATE: 16 BRPM | WEIGHT: 220 LBS | BODY MASS INDEX: 34.53 KG/M2 | HEART RATE: 65 BPM | HEIGHT: 67 IN | SYSTOLIC BLOOD PRESSURE: 151 MMHG | TEMPERATURE: 98 F | DIASTOLIC BLOOD PRESSURE: 75 MMHG

## 2024-03-20 DIAGNOSIS — R23.2 FACIAL FLUSHING: Primary | ICD-10-CM

## 2024-03-20 DIAGNOSIS — R01.1 SYSTOLIC MURMUR: ICD-10-CM

## 2024-03-20 LAB
ALBUMIN SERPL BCP-MCNC: 4.4 G/DL (ref 3.5–5.2)
ALP SERPL-CCNC: 78 U/L (ref 38–126)
ALT SERPL W/O P-5'-P-CCNC: 26 U/L (ref 10–44)
ANION GAP SERPL CALC-SCNC: 7 MMOL/L (ref 8–16)
AST SERPL-CCNC: 28 U/L (ref 15–46)
BASOPHILS # BLD AUTO: 0.03 K/UL (ref 0–0.2)
BASOPHILS NFR BLD: 0.7 % (ref 0–1.9)
BILIRUB SERPL-MCNC: 0.4 MG/DL (ref 0.1–1)
CALCIUM SERPL-MCNC: 9.5 MG/DL (ref 8.7–10.5)
CHLORIDE SERPL-SCNC: 103 MMOL/L (ref 95–110)
CO2 SERPL-SCNC: 30 MMOL/L (ref 23–29)
CREAT SERPL-MCNC: 0.52 MG/DL (ref 0.5–1.4)
DIFFERENTIAL METHOD BLD: ABNORMAL
EOSINOPHIL # BLD AUTO: 0.2 K/UL (ref 0–0.5)
EOSINOPHIL NFR BLD: 5.8 % (ref 0–8)
ERYTHROCYTE [DISTWIDTH] IN BLOOD BY AUTOMATED COUNT: 12.1 % (ref 11.5–14.5)
EST. GFR  (NO RACE VARIABLE): >60 ML/MIN/1.73 M^2
GLUCOSE SERPL-MCNC: 97 MG/DL (ref 70–110)
HCT VFR BLD AUTO: 36.9 % (ref 40–54)
HGB BLD-MCNC: 12.5 G/DL (ref 14–18)
IMM GRANULOCYTES # BLD AUTO: 0 K/UL (ref 0–0.04)
IMM GRANULOCYTES NFR BLD AUTO: 0 % (ref 0–0.5)
LYMPHOCYTES # BLD AUTO: 1.6 K/UL (ref 1–4.8)
LYMPHOCYTES NFR BLD: 38.9 % (ref 18–48)
MAGNESIUM SERPL-MCNC: 2.3 MG/DL (ref 1.6–2.6)
MCH RBC QN AUTO: 31.9 PG (ref 27–31)
MCHC RBC AUTO-ENTMCNC: 33.9 G/DL (ref 32–36)
MCV RBC AUTO: 94 FL (ref 82–98)
MONOCYTES # BLD AUTO: 0.5 K/UL (ref 0.3–1)
MONOCYTES NFR BLD: 11.5 % (ref 4–15)
NEUTROPHILS # BLD AUTO: 1.8 K/UL (ref 1.8–7.7)
NEUTROPHILS NFR BLD: 43.1 % (ref 38–73)
NRBC BLD-RTO: 0 /100 WBC
NT-PROBNP SERPL-MCNC: 116 PG/ML (ref 5–900)
OHS QRS DURATION: 92 MS
OHS QTC CALCULATION: 405 MS
PLATELET # BLD AUTO: 236 K/UL (ref 150–450)
PMV BLD AUTO: 8.7 FL (ref 9.2–12.9)
POTASSIUM SERPL-SCNC: 3.9 MMOL/L (ref 3.5–5.1)
PROT SERPL-MCNC: 7.9 G/DL (ref 6–8.4)
RBC # BLD AUTO: 3.92 M/UL (ref 4.6–6.2)
SODIUM SERPL-SCNC: 140 MMOL/L (ref 136–145)
TROPONIN I SERPL-MCNC: <0.012 NG/ML (ref 0.01–0.03)
TSH SERPL DL<=0.005 MIU/L-ACNC: 2.06 UIU/ML (ref 0.4–4)
UUN UR-MCNC: 11 MG/DL (ref 2–20)
WBC # BLD AUTO: 4.16 K/UL (ref 3.9–12.7)

## 2024-03-20 PROCEDURE — 93005 ELECTROCARDIOGRAM TRACING: CPT | Mod: ER

## 2024-03-20 PROCEDURE — 84484 ASSAY OF TROPONIN QUANT: CPT | Mod: ER | Performed by: EMERGENCY MEDICINE

## 2024-03-20 PROCEDURE — 83880 ASSAY OF NATRIURETIC PEPTIDE: CPT | Mod: ER | Performed by: EMERGENCY MEDICINE

## 2024-03-20 PROCEDURE — 94761 N-INVAS EAR/PLS OXIMETRY MLT: CPT | Mod: ER

## 2024-03-20 PROCEDURE — 84443 ASSAY THYROID STIM HORMONE: CPT | Mod: ER | Performed by: EMERGENCY MEDICINE

## 2024-03-20 PROCEDURE — 80053 COMPREHEN METABOLIC PANEL: CPT | Mod: ER | Performed by: EMERGENCY MEDICINE

## 2024-03-20 PROCEDURE — 83735 ASSAY OF MAGNESIUM: CPT | Mod: ER | Performed by: EMERGENCY MEDICINE

## 2024-03-20 PROCEDURE — 99900035 HC TECH TIME PER 15 MIN (STAT): Mod: ER

## 2024-03-20 PROCEDURE — 93010 ELECTROCARDIOGRAM REPORT: CPT | Mod: ,,, | Performed by: INTERNAL MEDICINE

## 2024-03-20 PROCEDURE — 25500020 PHARM REV CODE 255: Mod: ER | Performed by: EMERGENCY MEDICINE

## 2024-03-20 PROCEDURE — 99285 EMERGENCY DEPT VISIT HI MDM: CPT | Mod: 25,ER

## 2024-03-20 PROCEDURE — 85025 COMPLETE CBC W/AUTO DIFF WBC: CPT | Mod: ER | Performed by: EMERGENCY MEDICINE

## 2024-03-20 RX ADMIN — IOHEXOL 75 ML: 350 INJECTION, SOLUTION INTRAVENOUS at 01:03

## 2024-03-20 NOTE — ED PROVIDER NOTES
Encounter Date: 3/20/2024       History     Chief Complaint   Patient presents with    blood pressure issues      Patient had facial pressure and redness last night and this morning, has been dealing with blood pressure medication changes and thinks it is related      Patient presents with complaints of facial flushing, feeling of fullness to his neck for the past few days.  He denies any chest pain/pressure/tightness.  He denies any shortness of breath or dyspnea on exertion.  Denies any fevers/chills.  States had a recent medication change to metoprolol and has been having these problems since.    The history is provided by the patient.     Review of patient's allergies indicates:   Allergen Reactions    Methotrexate analogues Shortness Of Breath    Naproxen      Past Medical History:   Diagnosis Date    Arthritis     Polymyositis      Past Surgical History:   Procedure Laterality Date    CARPAL TUNNEL RELEASE  04/08/2013    right hand    Right knee arthroscopic  1991     Family History   Problem Relation Age of Onset    Diabetes Mother     Neuropathy Mother     Hypertension Mother     Heart disease Mother     Rheumatic fever Mother     Diabetes Father     Neuropathy Father     Cancer Father         multiple myeloma    Sarcoidosis Sister     Colon cancer Neg Hx     Celiac disease Neg Hx     Cirrhosis Neg Hx     Colon polyps Neg Hx     Crohn's disease Neg Hx     Cystic fibrosis Neg Hx     Esophageal cancer Neg Hx     Hemochromatosis Neg Hx     Inflammatory bowel disease Neg Hx     Irritable bowel syndrome Neg Hx     Liver cancer Neg Hx     Liver disease Neg Hx     Rectal cancer Neg Hx     Stomach cancer Neg Hx     Ulcerative colitis Neg Hx     Zachery's disease Neg Hx      Social History     Tobacco Use    Smoking status: Every Day     Current packs/day: 1.00     Average packs/day: 1 pack/day for 30.0 years (30.0 ttl pk-yrs)     Types: Cigarettes    Smokeless tobacco: Never   Substance Use Topics    Alcohol use: Yes      Comment: rarely    Drug use: No     Review of Systems   Skin:         Facial flushing       Physical Exam     Initial Vitals [03/20/24 1042]   BP Pulse Resp Temp SpO2   (!) 141/66 70 16 98 °F (36.7 °C) 98 %      MAP       --         Physical Exam    Vitals reviewed.  Constitutional: He appears well-developed.   Cardiovascular:  Normal rate and regular rhythm.           Murmur heard.  Pulmonary/Chest: Breath sounds normal. He has no wheezes.   Abdominal: Abdomen is soft. There is no abdominal tenderness.   Musculoskeletal:         General: Normal range of motion.     Neurological: He is alert and oriented to person, place, and time.   Skin: Skin is warm and dry. No rash noted.         ED Course   Procedures  Labs Reviewed   CBC W/ AUTO DIFFERENTIAL - Abnormal; Notable for the following components:       Result Value    RBC 3.92 (*)     Hemoglobin 12.5 (*)     Hematocrit 36.9 (*)     MCH 31.9 (*)     MPV 8.7 (*)     All other components within normal limits   COMPREHENSIVE METABOLIC PANEL - Abnormal; Notable for the following components:    CO2 30 (*)     Anion Gap 7 (*)     All other components within normal limits   TSH   MAGNESIUM   TROPONIN I   NT-PRO NATRIURETIC PEPTIDE          Imaging Results              CT Chest With Contrast (Final result)  Result time 03/20/24 13:38:23      Final result by Bronson Johns MD (03/20/24 13:38:23)                   Impression:      Basilar predominant peripheral linear reticular interstitial opacification, nonspecific, suspicious for underlying interstitial lung disease possibly NSIP, or UIP.  Findings similar to 2015.    No evidence of SVC syndrome.    All CT scans at this facility use dose modulation, iterative reconstruction and/or weight based dosing when appropriate to reduce radiation dose to as low as reasonably achievable.      Electronically signed by: Bronson Johns  Date:    03/20/2024  Time:    13:38               Narrative:    EXAMINATION:  CT CHEST WITH  CONTRAST    CLINICAL HISTORY:  Dyspnea, chronic, unclear etiology;facial flush, feeling of fullness to neck please eval for svc syndrome;    TECHNIQUE:  Low dose axial images, sagittal and coronal reformations were obtained from the thoracic inlet to the lung bases after IV contrast 75 ml Omnipaque 350.    COMPARISON:  06/19/2015    FINDINGS:  Base of Neck: No significant abnormality.    Thoracic soft tissues: Normal.    Aorta: Left-sided aortic arch.  No aneurysm.    Heart: Normal size. No effusion. Mild aortic and coronary artery atherosclerotic calcification.    Pulmonary vasculature: Pulmonary arteries distribute normally.    Ariadna/Mediastinum: Mildly enlarged mediastinal and hilar lymph nodes for example right paratracheal 2.1 x 1 cm lymph node and right hilar 2.4 x 1.5 cm lymph node.  Subcarinal 2.6 x 1.2 cm lymph node.  Lymph nodes of.  Minimally enlarged compared to 06/19/2015.    Esophagus: Normal.    Upper Abdomen: No abnormality of the partially imaged upper abdomen.    Airways: Patent.    Lungs/Pleura: Basilar predominant peripheral linear reticular interstitial opacification, nonspecific, suspicious for underlying interstitial lung disease possibly NSIP, or UIP.    Bones: No acute fracture. No suspicious lytic or sclerotic lesions.                                       X-Ray Chest AP Portable (Final result)  Result time 03/20/24 12:17:23      Final result by Bronson Johns MD (03/20/24 12:17:23)                   Impression:      No acute abnormality.      Electronically signed by: Bronson Johns  Date:    03/20/2024  Time:    12:17               Narrative:    EXAMINATION:  XR CHEST AP PORTABLE    CLINICAL HISTORY:  lightheadedness;.    TECHNIQUE:  Single frontal portable view of the chest was performed.    COMPARISON:  03/18/2024, 06/19/2015    FINDINGS:  Support devices: None    Unchanged bibasilar interstitial coarsening similar to prior exams.The lungs are otherwise clear, with normal appearance of  pulmonary vasculature and no pleural effusion or pneumothorax.    The cardiac silhouette is normal in size. The hilar and mediastinal contours are unremarkable.    Bones are intact.                                       Medications   iohexoL (OMNIPAQUE 350) injection 75 mL (75 mLs Intravenous Given 3/20/24 1300)     Medical Decision Making  Pt has systolic murmur.  No cp, no feelings of syncope.  No tachypnea, increased WOB, or hypoxia on ambulation.  Cardiology referral placed for further workup.  No signs of SVC syndrome.  Instructed to return immediately for any new or worsening symptoms and he verbalized understanding.    Amount and/or Complexity of Data Reviewed  Labs: ordered. Decision-making details documented in ED Course.  Radiology: ordered. Decision-making details documented in ED Course.    Risk  Prescription drug management.               ED Course as of 03/21/24 0919   Wed Mar 20, 2024   1240 X-Ray Chest AP Portable  No acute abnormality [CD]   1247 Troponin I  Within normal limits [CD]   1247 NT-Pro Natriuretic Peptide  Within normal limits [CD]   1247 Comprehensive metabolic panel(!)  Nonspecific findings [CD]   1247 Magnesium  Within normal limits [CD]   1247 CBC auto differential(!)  Chronic anemia noted [CD]   1401 CT Chest With Contrast  Basilar predominant peripheral linear reticular interstitial opacification, nonspecific, suspicious for underlying interstitial lung disease possibly NSIP, or UIP.  Findings similar to 2015.     No evidence of SVC syndrome.   [CD]      ED Course User Index  [CD] Edson Reynolds DO                           Clinical Impression:  Final diagnoses:  [R23.2] Facial flushing (Primary)  [R01.1] Systolic murmur          ED Disposition Condition    Discharge           ED Prescriptions    None       Follow-up Information       Follow up With Specialties Details Why Contact Info Additional Information    [facility] Schedule an  appointment as soon as possible for a visit   [address], [address]  [facility] [address] [phone] Please park in [address] or [address] and use [address]. Take Medical Office Bldg. elevators.             Edson Reynolds,   03/21/24 0979

## 2024-03-20 NOTE — ED NOTES
To ED via POV alone.  Patient reports c/o forehead rash and facial redness with flushed sensation intermittently for several weeks. Patient and PCP attributed symptoms to various HTN medications.  Patient has changed HTN medications at least three times, currently rx Isosorbide.      Despite medication changes, rash and redness with flushed sensation still present.  Denies SOB, chest pain, body pain, N/V.

## 2024-03-21 LAB
OHS QRS DURATION: 102 MS
OHS QTC CALCULATION: 430 MS

## 2024-04-23 ENCOUNTER — HOSPITAL ENCOUNTER (OUTPATIENT)
Dept: RADIOLOGY | Facility: HOSPITAL | Age: 54
Discharge: HOME | End: 2024-04-23
Attending: STUDENT IN AN ORGANIZED HEALTH CARE EDUCATION/TRAINING PROGRAM
Payer: OTHER GOVERNMENT

## 2024-04-23 ENCOUNTER — OFFICE VISIT (OUTPATIENT)
Dept: RHEUMATOLOGY | Facility: CLINIC | Age: 54
End: 2024-04-23
Payer: OTHER GOVERNMENT

## 2024-04-23 VITALS
HEIGHT: 67 IN | HEART RATE: 67 BPM | BODY MASS INDEX: 34.39 KG/M2 | WEIGHT: 219.13 LBS | SYSTOLIC BLOOD PRESSURE: 127 MMHG | DIASTOLIC BLOOD PRESSURE: 56 MMHG

## 2024-04-23 DIAGNOSIS — M33.20 POLYMYOSITIS: ICD-10-CM

## 2024-04-23 DIAGNOSIS — L71.9 ROSACEA: ICD-10-CM

## 2024-04-23 DIAGNOSIS — M46.1 SACROILIITIS: ICD-10-CM

## 2024-04-23 DIAGNOSIS — R53.1 WEAKNESS: ICD-10-CM

## 2024-04-23 DIAGNOSIS — J84.9 INTERSTITIAL LUNG DISEASE: Primary | ICD-10-CM

## 2024-04-23 DIAGNOSIS — M76.62 ACHILLES TENDINITIS OF LEFT LOWER EXTREMITY: ICD-10-CM

## 2024-04-23 DIAGNOSIS — M33.20 JO-1 POSITIVE POLYMYOSITIS: ICD-10-CM

## 2024-04-23 DIAGNOSIS — M19.90 INFLAMMATORY ARTHRITIS: ICD-10-CM

## 2024-04-23 DIAGNOSIS — E66.9 CLASS 1 OBESITY WITH BODY MASS INDEX (BMI) OF 34.0 TO 34.9 IN ADULT, UNSPECIFIED OBESITY TYPE, UNSPECIFIED WHETHER SERIOUS COMORBIDITY PRESENT: ICD-10-CM

## 2024-04-23 PROCEDURE — 72202 X-RAY EXAM SI JOINTS 3/> VWS: CPT | Mod: 26,,, | Performed by: RADIOLOGY

## 2024-04-23 PROCEDURE — 99999 PR PBB SHADOW E&M-EST. PATIENT-LVL III: CPT | Mod: PBBFAC,,, | Performed by: STUDENT IN AN ORGANIZED HEALTH CARE EDUCATION/TRAINING PROGRAM

## 2024-04-23 PROCEDURE — 72202 X-RAY EXAM SI JOINTS 3/> VWS: CPT | Mod: TC,FY

## 2024-04-23 PROCEDURE — 99205 OFFICE O/P NEW HI 60 MIN: CPT | Mod: S$PBB,,, | Performed by: STUDENT IN AN ORGANIZED HEALTH CARE EDUCATION/TRAINING PROGRAM

## 2024-04-23 PROCEDURE — 99213 OFFICE O/P EST LOW 20 MIN: CPT | Mod: PBBFAC,PO,25 | Performed by: STUDENT IN AN ORGANIZED HEALTH CARE EDUCATION/TRAINING PROGRAM

## 2024-04-23 RX ORDER — DOXYCYCLINE HYCLATE 100 MG
100 TABLET ORAL
COMMUNITY
Start: 2024-03-25

## 2024-04-23 RX ORDER — FLUCONAZOLE 200 MG/1
200 TABLET ORAL
COMMUNITY
Start: 2024-03-25

## 2024-04-23 NOTE — PROGRESS NOTES
RHEUMATOLOGY OUTPATIENT CLINIC NOTE    4/23/2024    Attending Rheumatologist: Mary Zimmerman  Primary Care Provider: No, Primary Doctor   Physician Requesting Consultation: Self, Aaareferral  No address on file  Chief Complaint/Reason For Consultation:  Consult      Subjective:       HPI  Malik Cunningham Jr. is a 53 y.o. White male with pmhx noted below referred for Polymyositis. When he was diagnosed he had carpal tunnel surgery he started swelling up and then diagnosed with PM.   He has been on methotrexate (had side effects), Azathioprine (100mg intolerant) and RTX (he did really well but stopped in 2016 -2017) because they wanted to start oral medication.   He received his first course of RTX on 1/17/14 & a 2nd one on 7/15/14.  He began to improve after first dose of vitamin D given for insufficiency (fatigue better) and continued to improve with less weakness after he received RTX  PCP at [facility].   Lauren-1+ polymyositis since May 2013 with proximal muscle weakness, elevated CPKs (maxed here at 2399) & muscle bx c/w PM. EMG & NCV showed only L5-S1 radiculopathy. Also has had sxs c/w Raynaud's, not now.   2015 last time he saw Dr. Pearson  Dermatology -- roseacea but started after Lisinopril then changed but the next medicine caused other side effects Bblocker with severely elevated BP. Now on Imdur and patient reports better controlled(unclear why on this for BP). Fluconazole and doxycycline for rosacea.   Quit 2016 smoking   ?ILD due to autoimmune disease last CT chest 3/2024 with ILD no much change from 2015          Review of Systems   Constitutional:  Positive for fatigue. Negative for fever and unexpected weight change.   HENT:  Negative for mouth dryness and mouth sores.    Respiratory:  Positive for cough and shortness of breath (Comes in flare ups.).    Cardiovascular:  Positive for chest pain. Negative for leg swelling.   Musculoskeletal:  Positive for arthralgias, joint swelling  and myalgias.   Integumentary:  Positive for rash (erythematous face rash).   Neurological:  Negative for numbness and memory loss.   Hematological:  Negative for adenopathy.   Psychiatric/Behavioral:  Negative for agitation and behavioral problems.         Chronic comorbid conditions affecting medical decision making today:  Past Medical History:   Diagnosis Date    Arthritis     Polymyositis      Past Surgical History:   Procedure Laterality Date    CARPAL TUNNEL RELEASE  04/08/2013    right hand    Right knee arthroscopic  1991     Family History   Problem Relation Name Age of Onset    Diabetes Mother      Neuropathy Mother      Hypertension Mother      Heart disease Mother      Rheumatic fever Mother      Diabetes Father      Neuropathy Father      Cancer Father          multiple myeloma    Sarcoidosis Sister      Colon cancer Neg Hx      Celiac disease Neg Hx      Cirrhosis Neg Hx      Colon polyps Neg Hx      Crohn's disease Neg Hx      Cystic fibrosis Neg Hx      Esophageal cancer Neg Hx      Hemochromatosis Neg Hx      Inflammatory bowel disease Neg Hx      Irritable bowel syndrome Neg Hx      Liver cancer Neg Hx      Liver disease Neg Hx      Rectal cancer Neg Hx      Stomach cancer Neg Hx      Ulcerative colitis Neg Hx      Zachery's disease Neg Hx       Social History     Substance and Sexual Activity   Alcohol Use Yes    Comment: rarely     Social History     Tobacco Use   Smoking Status Every Day    Current packs/day: 1.00    Average packs/day: 1 pack/day for 30.0 years (30.0 ttl pk-yrs)    Types: Cigarettes   Smokeless Tobacco Never     Social History     Substance and Sexual Activity   Drug Use No       Current Outpatient Medications:     alendronate (FOSAMAX) 70 MG tablet, Take 1 tablet (70 mg total) by mouth every 7 days., Disp: 4 tablet, Rfl: 3    doxycycline (VIBRA-TABS) 100 MG tablet, 100 mg., Disp: , Rfl:     ERGOCALCIFEROL, VITAMIN D2, (VITAMIN D ORAL), Take 5,000 Units by mouth 3 (three) times  a week., Disp: , Rfl:     fluconazole (DIFLUCAN) 200 MG Tab, 200 mg., Disp: , Rfl:     RITUXIMAB (RITUXAN IV), Inject into the vein as directed., Disp: , Rfl:     calcium carbonate (TUMS) 200 mg calcium (500 mg) chewable tablet, Take 1 tablet by mouth once daily. (Patient not taking: Reported on 4/23/2024), Disp: , Rfl:     isosorbide mononitrate (IMDUR) 30 MG 24 hr tablet, Take 1 tablet (30 mg total) by mouth once daily. (Patient not taking: Reported on 4/23/2024), Disp: 30 tablet, Rfl: 5    omeprazole (PRILOSEC) 20 MG capsule, Take 1 capsule (20 mg total) by mouth once daily. (Patient not taking: Reported on 4/23/2024), Disp: 90 capsule, Rfl: 3    predniSONE (DELTASONE) 2.5 MG tablet, Take 17.5mg po daily alternate with 20mg po daily (Patient not taking: Reported on 4/23/2024), Disp: 200 tablet, Rfl: 1    predniSONE (DELTASONE) 5 MG tablet, Take 4 tablets (20 mg total) by mouth once daily. (Patient not taking: Reported on 4/23/2024), Disp: 360 tablet, Rfl: 1    tuberculin 5 tub. unit /0.1 mL injection, 0.5 ml intradermal injection (Patient not taking: Reported on 4/23/2024), Disp: 0.5 mL, Rfl: 0     Objective:         Vitals:    04/23/24 1359   BP: (!) 127/56   Pulse: 67     Physical Exam   Constitutional: normal appearance.   HENT:   Head: Normocephalic and atraumatic.   Nose: Nose normal.   Mouth/Throat: Mucous membranes are moist.   Eyes: Conjunctivae are normal.   Cardiovascular: Normal rate and regular rhythm.   Pulmonary/Chest: Effort normal and breath sounds normal.   Musculoskeletal:         General: Tenderness (MCP and PIP) present. No swelling. Normal range of motion.      Cervical back: Normal range of motion.   Neurological: He is alert. He displays weakness (HIp flexor).   Skin: Rash noted.   Psychiatric: Mood normal.       Reviewed old and all outside pertinent medical records available.    All lab results personally reviewed and interpreted by me.  Lab Results   Component Value Date    WBC 4.16  "03/20/2024    HGB 12.5 (L) 03/20/2024    HCT 36.9 (L) 03/20/2024    MCV 94 03/20/2024    MCH 31.9 (H) 03/20/2024    MCHC 33.9 03/20/2024    RDW 12.1 03/20/2024     03/20/2024    MPV 8.7 (L) 03/20/2024       Lab Results   Component Value Date     03/20/2024    K 3.9 03/20/2024     03/20/2024    CO2 30 (H) 03/20/2024    GLU 97 03/20/2024    BUN 11 03/20/2024    CALCIUM 9.5 03/20/2024    PROT 7.9 03/20/2024    ALBUMIN 4.4 03/20/2024    BILITOT 0.4 03/20/2024    AST 28 03/20/2024    ALKPHOS 78 03/20/2024    ALT 26 03/20/2024       No results found for: "COLORU", "APPEARANCEUA", "SPECGRAV", "PHUR", "PHUA", "PROTEINUA", "GLUCOSEU", "KETONESU", "BLOODU", "LEUKOCYTESUR", "NITRITE", "UROBILINOGEN"    Lab Results   Component Value Date    CRP 12.7 (H) 09/15/2015       Lab Results   Component Value Date    SEDRATE 60 (H) 09/15/2015       Lab Results   Component Value Date    RF <10.0 10/21/2013    SEDRATE 60 (H) 09/15/2015       No components found for: "25OHVITDTOT", "13EBJQME8", "58AWCUWK0", "METHODNOTE"      Imaging:  All imaging reviewed and independently interpreted by me.         ASSESSMENT / PLAN:     Malik Cunningham . is a 53 y.o. White male with:      Polymyositis   Weakness  Inflammatory arthritis   Roseacea   Achilles tendonitis   Knee rash   Sacroiliitis   Lauren-1 positive   ILD  - Will update blood work to assess if he needs immunosuppression   - Need to evaluate for PSA  because he has knee rash that looks like PSO   - continue to follow up with dermatology- needs to start Doxycycline and fluconazole   - Infectious work up  - There is inflammation in MCP and PIP-- will need to evaluate to assess need for medication     7. Other specified counseling  - over 10 minutes spent regarding below topics:  - Immunization counseling done.  - Weight loss counseling done.  - Nutrition and exercise counseling.  - Limitation of alcohol consumption.  - Regular exercise:  Aerobic and resistance.  - Medication " counseling provided.    8. Morbid Obesity  - would benefit from decreasing at least 10% of body weight.  - recommended goal of losing 1 lb per week.  - consider nutritionist evaluation.  - would consider screening for SHENA per PMD.    No follow-ups on file.    Method of contact with patient concerns: [facility]    Disclaimer:  This note is prepared using voice recognition software and as such is likely to have errors and has not been proof read. Please contact me for questions.     Time spent: 60 minutes in face to face discussion concerning diagnosis, prognosis, review of lab and test results, benefits of treatment as well as management of disease, counseling of patient and coordination of care between various health care providers.  Greater than half the time spent was used for coordination of care and counseling of patient.    Mary Zimmerman M.D.  Rheumatology Department   [facility]

## 2024-07-02 ENCOUNTER — HOSPITAL ENCOUNTER (EMERGENCY)
Facility: HOSPITAL | Age: 54
Discharge: HOME | End: 2024-07-02
Attending: EMERGENCY MEDICINE
Payer: OTHER GOVERNMENT

## 2024-07-02 VITALS
HEIGHT: 67 IN | TEMPERATURE: 99 F | BODY MASS INDEX: 34.53 KG/M2 | HEART RATE: 74 BPM | RESPIRATION RATE: 18 BRPM | OXYGEN SATURATION: 95 % | SYSTOLIC BLOOD PRESSURE: 157 MMHG | WEIGHT: 220 LBS | DIASTOLIC BLOOD PRESSURE: 70 MMHG

## 2024-07-02 DIAGNOSIS — I10 HYPERTENSION, UNSPECIFIED TYPE: ICD-10-CM

## 2024-07-02 DIAGNOSIS — Z76.0 MEDICATION REFILL: Primary | ICD-10-CM

## 2024-07-02 PROCEDURE — 99281 EMR DPT VST MAYX REQ PHY/QHP: CPT | Mod: ER

## 2024-07-02 RX ORDER — LISINOPRIL 10 MG/1
5 TABLET ORAL DAILY
Qty: 30 TABLET | Refills: 0 | Status: SHIPPED | OUTPATIENT
Start: 2024-07-02 | End: 2025-07-02

## 2024-07-02 NOTE — ED NOTES
APPEARANCE: Alert, oriented and in no acute distress.  CARDIAC: Normal rate and rhythm, no murmur heard.   PERIPHERAL VASCULAR: peripheral pulses present. Normal cap refill. No edema. Warm to touch.    RESPIRATORY:Normal rate and effort, breath sounds clear bilaterally throughout chest. Respirations are equal and unlabored no obvious signs of distress.  GASTRO: soft, bowel sounds normal, no tenderness, no abdominal distention.  MUSC: Full ROM. No bony tenderness or soft tissue tenderness. No obvious deformity.  SKIN: Skin is warm and dry, normal skin turgor, mucous membranes moist.  NEURO: 5/5 strength major flexors/extensors bilaterally. Sensory intact to light touch bilaterally. Oral coma scale: eyes open spontaneously-4, oriented & converses-5, obeys commands-6. No neurological abnormalities.   MENTAL STATUS: awake, alert and aware of environment.  EYE: PERRL, both eyes: pupils brisk and reactive to light. Normal size.

## 2024-07-02 NOTE — ED PROVIDER NOTES
Encounter Date: 7/2/2024       History     Chief Complaint   Patient presents with    Medication Refill     PT states he has veen on medication and he needs more of one of his BP meds until he sees is doctor at the end of the Month. PT denies blurry vision, headache.      53-year-old male with a history of hypertension, arthritis presents with request for refill of lisinopril.  Patient was previously on lisinopril for high blood pressure but due to adverse reactions, this was changed.  Initially was changed from metoprolol but then was changed to isosorbide.  Patient says his blood pressure has been not well controlled with pills isosorbide so he started taking 5 mg lisinopril a day and this seems to be controlling his blood pressure.  He has no symptoms today.  He has an appointment with his primary care doctor in July.      Review of patient's allergies indicates:   Allergen Reactions    Methotrexate analogues Shortness Of Breath    Naproxen      Past Medical History:   Diagnosis Date    Arthritis     Polymyositis      Past Surgical History:   Procedure Laterality Date    CARPAL TUNNEL RELEASE  04/08/2013    right hand    Right knee arthroscopic  1991     Family History   Problem Relation Name Age of Onset    Diabetes Mother      Neuropathy Mother      Hypertension Mother      Heart disease Mother      Rheumatic fever Mother      Diabetes Father      Neuropathy Father      Cancer Father          multiple myeloma    Sarcoidosis Sister      Colon cancer Neg Hx      Celiac disease Neg Hx      Cirrhosis Neg Hx      Colon polyps Neg Hx      Crohn's disease Neg Hx      Cystic fibrosis Neg Hx      Esophageal cancer Neg Hx      Hemochromatosis Neg Hx      Inflammatory bowel disease Neg Hx      Irritable bowel syndrome Neg Hx      Liver cancer Neg Hx      Liver disease Neg Hx      Rectal cancer Neg Hx      Stomach cancer Neg Hx      Ulcerative colitis Neg Hx      Zachery's disease Neg Hx       Social History     Tobacco Use     Smoking status: Every Day     Current packs/day: 1.00     Average packs/day: 1 pack/day for 30.0 years (30.0 ttl pk-yrs)     Types: Cigarettes    Smokeless tobacco: Never   Substance Use Topics    Alcohol use: Yes     Comment: rarely    Drug use: No     Review of Systems   Constitutional:  Negative for appetite change.   Eyes:  Negative for pain.   Respiratory:  Negative for shortness of breath.    Cardiovascular:  Negative for chest pain.   Gastrointestinal:  Negative for abdominal pain, nausea and vomiting.   Genitourinary:  Negative for frequency.   Musculoskeletal:  Negative for arthralgias and neck pain.   Neurological:  Negative for headaches.   Psychiatric/Behavioral:  Negative for confusion.        Physical Exam     Initial Vitals [07/02/24 0715]   BP Pulse Resp Temp SpO2   (!) 157/70 74 18 98.5 °F (36.9 °C) 95 %      MAP       --         Physical Exam    Nursing note and vitals reviewed.  HENT:   Head: Atraumatic.   Eyes: Conjunctivae and EOM are normal.   Neck:   Normal range of motion.  Cardiovascular:      Exam reveals no gallop and no friction rub.       No murmur heard.  Pulmonary/Chest: Breath sounds normal. No respiratory distress. He has no wheezes. He has no rales.   Abdominal: Abdomen is soft. Bowel sounds are normal. He exhibits no distension. There is no abdominal tenderness.   Musculoskeletal:         General: No edema. Normal range of motion.      Cervical back: Normal range of motion.     Neurological: He is alert and oriented to person, place, and time. He has normal strength. No cranial nerve deficit or sensory deficit.   Skin: Skin is warm and dry.   Psychiatric: He has a normal mood and affect.         ED Course   Procedures  Labs Reviewed - No data to display       Imaging Results    None          Medications - No data to display  Medical Decision Making  53-year-old male with a history of hypertension presenting for medication refill of lisinopril.  Has appointment with primary care  later this month.  No symptoms currently.  No evidence of hypertensive urgency or emergency.  Attempted to contact primary care doctor regarding follow up however no one answered the phone.  [facility]  Will refill his lisinopril and have patient follow up with primary care.  Return instructions given.    Risk  Prescription drug management.                                      Clinical Impression:  Final diagnoses:  [Z76.0] Medication refill (Primary)  [I10] Hypertension, unspecified type          ED Disposition Condition    Discharge Stable          ED Prescriptions       Medication Sig Dispense Start Date End Date Auth. Provider    lisinopriL 10 MG tablet Take 0.5 tablets (5 mg total) by mouth once daily. 30 tablet 7/2/2024 7/2/2025 Jarocho Castillo MD          Follow-up Information       Follow up With Specialties Details Why Contact Info    Your Primary care  Schedule an appointment as soon as possible for a visit                Jarocho Castillo MD  07/02/24 3752

## 2025-05-02 DIAGNOSIS — M33.20 POLYMYOSITIS, ORGAN INVOLVEMENT UNSPECIFIED: Primary | ICD-10-CM
